# Patient Record
Sex: FEMALE | Race: WHITE | NOT HISPANIC OR LATINO | URBAN - METROPOLITAN AREA
[De-identification: names, ages, dates, MRNs, and addresses within clinical notes are randomized per-mention and may not be internally consistent; named-entity substitution may affect disease eponyms.]

---
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
## 2019-01-02 ENCOUNTER — TRANSCRIPTION ENCOUNTER (OUTPATIENT)
Age: 19
End: 2019-01-02

## 2019-01-04 ENCOUNTER — OTHER (OUTPATIENT)
Age: 19
End: 2019-01-04

## 2019-04-03 NOTE — ED STATDOCS - CARE PLAN
You can access the [facility] Patient Portal, offered by [facility], by registering with the following website: [email or web address]
Principal Discharge DX:	Knee pain, right

## 2020-11-23 PROBLEM — Q52.4 OTHER CONGENITAL MALFORMATIONS OF VAGINA: Chronic | Status: ACTIVE | Noted: 2017-06-14

## 2020-11-23 PROBLEM — L70.9 ACNE, UNSPECIFIED: Chronic | Status: ACTIVE | Noted: 2017-06-14

## 2020-11-23 PROBLEM — J45.909 UNSPECIFIED ASTHMA, UNCOMPLICATED: Chronic | Status: ACTIVE | Noted: 2017-06-14

## 2020-12-14 NOTE — H&P PST ADULT - WEIGHT IN KG
Eucrisa Pregnancy And Lactation Text: This medication has not been assigned a Pregnancy Risk Category but animal studies failed to show danger with the topical medication. It is unknown if the medication is excreted in breast milk. 66.2

## 2021-01-05 ENCOUNTER — APPOINTMENT (OUTPATIENT)
Dept: OBGYN | Facility: CLINIC | Age: 21
End: 2021-01-05
Payer: COMMERCIAL

## 2021-01-05 ENCOUNTER — TRANSCRIPTION ENCOUNTER (OUTPATIENT)
Age: 21
End: 2021-01-05

## 2021-01-05 VITALS
WEIGHT: 152 LBS | SYSTOLIC BLOOD PRESSURE: 110 MMHG | BODY MASS INDEX: 28.7 KG/M2 | DIASTOLIC BLOOD PRESSURE: 64 MMHG | HEIGHT: 61 IN

## 2021-01-05 DIAGNOSIS — Z80.0 FAMILY HISTORY OF MALIGNANT NEOPLASM OF DIGESTIVE ORGANS: ICD-10-CM

## 2021-01-05 DIAGNOSIS — Z01.419 ENCOUNTER FOR GYNECOLOGICAL EXAMINATION (GENERAL) (ROUTINE) W/OUT ABNORMAL FINDINGS: ICD-10-CM

## 2021-01-05 DIAGNOSIS — Z30.9 ENCOUNTER FOR CONTRACEPTIVE MANAGEMENT, UNSPECIFIED: ICD-10-CM

## 2021-01-05 DIAGNOSIS — Z87.09 PERSONAL HISTORY OF OTHER DISEASES OF THE RESPIRATORY SYSTEM: ICD-10-CM

## 2021-01-05 PROCEDURE — 99072 ADDL SUPL MATRL&STAF TM PHE: CPT

## 2021-01-05 PROCEDURE — 99385 PREV VISIT NEW AGE 18-39: CPT

## 2021-01-06 LAB
C TRACH RRNA SPEC QL NAA+PROBE: NOT DETECTED
N GONORRHOEA RRNA SPEC QL NAA+PROBE: NOT DETECTED
SOURCE AMPLIFICATION: NORMAL

## 2021-01-08 PROBLEM — Z01.419 WELL WOMAN EXAM WITH ROUTINE GYNECOLOGICAL EXAM: Status: ACTIVE | Noted: 2021-01-08

## 2021-01-08 PROBLEM — Z30.9 CONTRACEPTION MANAGEMENT: Status: ACTIVE | Noted: 2021-01-05

## 2021-01-08 NOTE — DISCUSSION/SUMMARY
[FreeTextEntry1] : Health Maintenance:\par pap today\par TBSE\par reviewed STD screening\par \par contraception:\par - Risks, benefits and alternatives to OCP discussed with patient including breast cancer, venous thromboembolism and benefits of reduced ovarian cancer. Patient will continue at this time.\par RTO 6 months

## 2021-01-08 NOTE — HISTORY OF PRESENT ILLNESS
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  Now graduate school.\par

## 2021-01-21 RX ORDER — LEVONORGESTREL AND ETHINYL ESTRADIOL 0.1-0.02MG
0.1-2 KIT ORAL
Qty: 3 | Refills: 1 | Status: ACTIVE | COMMUNITY

## 2022-06-03 NOTE — H&P PST ADULT - TOBACCO USE
Anxiety    Generalized anxiety disorder (SEGUNDO) is a mental disorder. It is defined as anxiety that is not necessarily related to specific events or activities or is out of proportion to said events. Symptoms include restlessness, fatigue, difficulty concentrations, irritability and difficulty concentrating. It interferes with life functions, including relationships, work, and school. If you were started on a medication make sure to take exactly as prescribed and follow up with a psychiatrist.    SEEK IMMEDIATE MEDICAL CARE IF YOU HAVE THE FOLLOWING SYMPTOMS: thoughts about hurting killing yourself, thoughts about hurting or killing somebody else, hallucinations, or worsening depression.    Depression    Depression is a mental illness that usually causes feelings of sadness, hopelessness, or helplessness. Some people with this disorder do not feel particularly sad but lose interest in doing things they used to enjoy (anhedonia). Major depressive disorder also can cause physical symptoms. It can interfere with work, school, relationships, and other normal everyday activities. If you were started on a medication make sure to take exactly as prescribed and follow up with a psychiatrist.    SEEK IMMEDIATE MEDICAL CARE IF YOU HAVE THE FOLLOWING SYMPTOMS: thoughts about hurting killing yourself, thoughts about hurting or killing somebody else, hallucinations, or worsening depression.
Never smoker

## 2023-06-09 NOTE — ED STATDOCS - DISPOSITION TYPE
[No Acute Distress] : no acute distress [Well Nourished] : well nourished [Well Developed] : well developed [Well-Appearing] : well-appearing [Normal Sclera/Conjunctiva] : normal sclera/conjunctiva [PERRL] : pupils equal round and reactive to light [EOMI] : extraocular movements intact [Normal Outer Ear/Nose] : the outer ears and nose were normal in appearance [Supple] : supple [No Respiratory Distress] : no respiratory distress  [Clear to Auscultation] : lungs were clear to auscultation bilaterally [Normal Rate] : normal rate  [Regular Rhythm] : with a regular rhythm [Normal S1, S2] : normal S1 and S2 [Soft] : abdomen soft [Non Tender] : non-tender [Non-distended] : non-distended [Normal Bowel Sounds] : normal bowel sounds [Grossly Normal Strength/Tone] : grossly normal strength/tone [Coordination Grossly Intact] : coordination grossly intact [Normal Gait] : normal gait [Speech Grossly Normal] : speech grossly normal [Normal Affect] : the affect was normal [Alert and Oriented x3] : oriented to person, place, and time [Normal Mood] : the mood was normal [Normal Insight/Judgement] : insight and judgment were intact DISCHARGE

## 2024-02-16 NOTE — ED STATDOCS - ATTENDING CONTRIBUTION TO CARE
Universal Safety Interventions
I, Dulce Lundberg MD,  performed the initial face to face bedside interview with this patient regarding history of present illness, review of symptoms and relevant past medical, social and family history.  I completed an independent physical examination.  I was the initial provider who evaluated this patient. I have signed out the follow up of any pending tests (i.e. labs, radiological studies) to the ACP.  I have communicated the patient’s plan of care and disposition with the ACP.  The history, relevant review of systems, past medical and surgical history, medical decision making, and physical examination was documented by the scribe in my presence and I attest to the accuracy of the documentation.

## 2025-05-09 ENCOUNTER — APPOINTMENT (OUTPATIENT)
Dept: OBGYN | Facility: CLINIC | Age: 25
End: 2025-05-09

## 2025-05-09 ENCOUNTER — NON-APPOINTMENT (OUTPATIENT)
Age: 25
End: 2025-05-09

## 2025-05-09 VITALS
DIASTOLIC BLOOD PRESSURE: 62 MMHG | HEIGHT: 61 IN | SYSTOLIC BLOOD PRESSURE: 110 MMHG | WEIGHT: 176 LBS | BODY MASS INDEX: 33.23 KG/M2

## 2025-05-09 DIAGNOSIS — Z30.9 ENCOUNTER FOR CONTRACEPTIVE MANAGEMENT, UNSPECIFIED: ICD-10-CM

## 2025-05-09 DIAGNOSIS — Z12.4 ENCOUNTER FOR SCREENING FOR MALIGNANT NEOPLASM OF CERVIX: ICD-10-CM

## 2025-05-09 PROCEDURE — 99385 PREV VISIT NEW AGE 18-39: CPT

## 2025-05-14 LAB — CYTOLOGY CVX/VAG DOC THIN PREP: NORMAL

## 2025-06-03 ENCOUNTER — APPOINTMENT (OUTPATIENT)
Dept: OBGYN | Facility: CLINIC | Age: 25
End: 2025-06-03